# Patient Record
(demographics unavailable — no encounter records)

---

## 2024-10-16 NOTE — ASSESSMENT
[FreeTextEntry1] : Advanced bilateral GH DJD. Previously discussed surgery, he wants to hold off still.  Diclofenac gel 1% BID prescribed. Repeat bilateral GH injections today on 10/16/24.  RTO prn.   Procedure Note: Large Joint Injection was performed because of pain and inflammation, failure of conservative treatment.   Medications: Depo-Medrol: 1 cc, 80 mg. Lidocaine: 2 cc, 1%.  Marcaine: 2 cc, .25%.   Medication was injected in the right glenohumeral joint. Patient has tried OTC's including aspirin, Ibuprofen, Aleve etc or prescription NSAIDs, and/or exercises at home and/ or physical therapy without satisfactory response. The risks, benefits, and alternatives to cortisone injection were explained in full to the patient. Risks outlined include but are not limited to infection, sepsis, bleeding, scarring, skin discoloration, temporary increase in pain, syncopal episode, failure to resolve symptoms, allergic reaction, symptom recurrence, and elevation of blood sugar in diabetics. Patient understood the risks. All questions were answered. After discussion of options, patient requested an injection. Oral informed consent was obtained and sterile prep of the injection site was performed using alcohol. Sterile technique was utilized for the procedure including the preparation of the solutions used for the injection. Ethyl chloride spray was used topically.  Sterile technique used. Patient tolerated procedure well. Post Procedure Instructions: Patient was advised to call if redness, pain, or fever occur and apply ice for 15 min. out of every hour for the next 12-24 hours as tolerated. patient was advised to rest the joint(s) for 2 days.  Ultrasound Guidance was used for the following reasons: for Glenohumeral injection.  Ultrasound guided injection was performed of the shoulder, visualization of the needle and placement of injection was performed without complication.  Procedure Note: Large Joint Injection was performed because of pain and inflammation, failure of conservative treatment.   Medications: Depo-Medrol: 1 cc, 80 mg. Lidocaine: 2 cc, 1%.  Marcaine: 2 cc, .25%.   Medication was injected in the left glenohumeral joint. Patient has tried OTC's including aspirin, Ibuprofen, Aleve etc or prescription NSAIDs, and/or exercises at home and/ or physical therapy without satisfactory response. The risks, benefits, and alternatives to cortisone injection were explained in full to the patient. Risks outlined include but are not limited to infection, sepsis, bleeding, scarring, skin discoloration, temporary increase in pain, syncopal episode, failure to resolve symptoms, allergic reaction, symptom recurrence, and elevation of blood sugar in diabetics. Patient understood the risks. All questions were answered. After discussion of options, patient requested an injection. Oral informed consent was obtained and sterile prep of the injection site was performed using alcohol. Sterile technique was utilized for the procedure including the preparation of the solutions used for the injection. Ethyl chloride spray was used topically.  Sterile technique used. Patient tolerated procedure well. Post Procedure Instructions: Patient was advised to call if redness, pain, or fever occur and apply ice for 15 min. out of every hour for the next 12-24 hours as tolerated. patient was advised to rest the joint(s) for 2 days.  Ultrasound Guidance was used for the following reasons: for Glenohumeral injection.  Ultrasound guided injection was performed of the shoulder, visualization of the needle and placement of injection was performed without complication.

## 2024-10-16 NOTE — HISTORY OF PRESENT ILLNESS
[Gradual] : gradual [8] : 8 [7] : 7 [Radiating] : radiating [Constant] : constant [Rest] : rest [Meds] : meds [Retired] : Work status: retired [de-identified] : 10/16/24 - pt is here for fu on bilateral shoulders, pt state last visit CSI injection helped. would like a repeat of CSI.  6/21/24:  He got great relief from the last injections, flaring up just about 3 weeks ago.    1/17/24: Here for follow up. Patient has relief from CSI until about 2 weeks ago. There was no recent injury.   10/6/23: 80 yo RHD male with bilateral shoulder pain for years, worse more recently. He was last seen in 2021 and had CSI with good relief. There is pain with reaching overhead. He has pain at night.  [] : no [FreeTextEntry1] : b/l shoulders  [FreeTextEntry7] : down to biceps [FreeTextEntry9] : acetametophine  [de-identified] : activity  [de-identified] : 2 years ago +

## 2024-10-21 NOTE — ASSESSMENT
[FreeTextEntry1] : The patient was advised of the diagnosis. The natural history of the pathology was explained in full to the patient in layman's terms. All questions were answered. The risks and benefits of surgical and non-surgical treatment alternatives were explained in full to the patient.  We reviewed the anatomy of the flexor sheath and pathology of trigger fingers with the use of drawings and discussion.  We discussed the treatment options including splinting/nsaids, injection and surgery.  After a discussion of the risks, benefits and alternatives along with the expectations, the patient was amenable to injection.  The indication for injection is pain and inflammation.  The skin overlying the tendon sheath/A1 pulley site was prepared with alcohol and ethyl chloride was sprayed topically.  Sterile technique was used. An injection of 1ml of lidocaine and 6mg of betamethasone was used.  The patient was instructed to call if redness, pain or fever occur.  They may apply ice for 15 minutes every hour for the next 12-24 hours as tolerated.  The patient understands that it may take 2-5 days to see a noticeable difference.  Sterile Band-Aid was applied.  pt was given CSI#3 in the left middle finger  pt will be scheduled for surgery - after thanksgiving - for left middle trigger finger release  Risks including but not limited to infection, blood loss, tendon damage and delayed tendon disruption, muscle damage, nerve damage, stiffness, pain, arthritis, loss of function, potential for secondary surgery, loss of limb or life. The patient had the opportunity to ask questions and all questions were answered to their satisfaction.  We again reviewed the anatomy of the flexor sheath and pathology of trigger fingers with the use of drawings and discussion.  The patient has failed injections/nonoperative treatment.  We discussed the possibility of surgery including the use of an open trigger finger release.  We discussed that too many injections may lead to weakening of the tendon/tendon rupture and that surgery is indicated at this point.  Risks include bleeding, infection, injury to nerves, vessels, tendons, stiffness, pain, loss of range of motion, loss of function, CRPS, and risks and complications of anesthesia.  We discussed the surgical plan and post op expectations.  We also discussed the possibility of prolonged pain/scar tissue and the possible need for therapy.  The patient is amenable to the risks, had the opportunity to ask questions, all questions were answered and the patient signed consent of their own accord.  They will be contacted by my surgical scheduler.  f/u after surgery

## 2024-10-21 NOTE — HISTORY OF PRESENT ILLNESS
[1] : 2 [0] : 0 [Burning] : burning [Sharp] : sharp [Occasional] : occasional [Rest] : rest [de-identified] : 10/21/24: Pt is here for follow up for LT hand . Pt states that he is having stiffness in his entire hand. States that trigger finger in middle finger has been acting up.  6/24/2024: pt here with middle finger recurrent locking s/p CSI #1. Pt denies hx of trauma.   1/29/24:  left middle finger locking.  [] : no [FreeTextEntry1] : Left Hand [FreeTextEntry5] : Patient MARIMAR ALONSO is 81 years old and is being evaluated for the Left hand after experiencing pain.

## 2025-06-28 NOTE — HISTORY OF PRESENT ILLNESS
[FreeTextEntry1] : 82 yo M with history of left renal mass on active surveillance Last evaluated in 2018 by Dr. Gooden no flank pain no urinary issues No urology visit or imaging done since 2018 Of note, history of right partial nephrectomy - oncocytoma  1/3/24 Interval history: Doing well since last visit with no issues Here to review MRI  6/19/24 Interval history: No complaints today Had CT on 6/13/24 which showed slight increase in left renal mass size from 1.8cm to 2.2cm as well as an enhancing mural nodule in one of the cysts and a prominent low-density aortocaval lymph node  6/2

## 2025-07-22 NOTE — ASSESSMENT
[FreeTextEntry1] : Advanced bilateral GH DJD. Previously discussed surgery, he wants to hold off still.  Diclofenac gel 1% BID as needed.  Repeat bilateral GH injections today on 10/16/24.  RTO prn.   Procedure Note: Large Joint Injection was performed because of pain and inflammation, failure of conservative treatment.   Medications: Depo-Medrol: 1 cc, 80 mg. Lidocaine: 2 cc, 1%.  Marcaine: 2 cc, .25%.   Medication was injected in the right glenohumeral joint. Patient has tried OTC's including aspirin, Ibuprofen, Aleve etc or prescription NSAIDs, and/or exercises at home and/ or physical therapy without satisfactory response. The risks, benefits, and alternatives to cortisone injection were explained in full to the patient. Risks outlined include but are not limited to infection, sepsis, bleeding, scarring, skin discoloration, temporary increase in pain, syncopal episode, failure to resolve symptoms, allergic reaction, symptom recurrence, and elevation of blood sugar in diabetics. Patient understood the risks. All questions were answered. After discussion of options, patient requested an injection. Oral informed consent was obtained and sterile prep of the injection site was performed using alcohol. Sterile technique was utilized for the procedure including the preparation of the solutions used for the injection. Ethyl chloride spray was used topically.  Sterile technique used. Patient tolerated procedure well. Post Procedure Instructions: Patient was advised to call if redness, pain, or fever occur and apply ice for 15 min. out of every hour for the next 12-24 hours as tolerated. patient was advised to rest the joint(s) for 2 days.  Ultrasound Guidance was used for the following reasons: for Glenohumeral injection.  Ultrasound guided injection was performed of the shoulder, visualization of the needle and placement of injection was performed without complication.  Procedure Note: Large Joint Injection was performed because of pain and inflammation, failure of conservative treatment.   Medications: Depo-Medrol: 1 cc, 80 mg. Lidocaine: 2 cc, 1%.  Marcaine: 2 cc, .25%.   Medication was injected in the left glenohumeral joint. Patient has tried OTC's including aspirin, Ibuprofen, Aleve etc or prescription NSAIDs, and/or exercises at home and/ or physical therapy without satisfactory response. The risks, benefits, and alternatives to cortisone injection were explained in full to the patient. Risks outlined include but are not limited to infection, sepsis, bleeding, scarring, skin discoloration, temporary increase in pain, syncopal episode, failure to resolve symptoms, allergic reaction, symptom recurrence, and elevation of blood sugar in diabetics. Patient understood the risks. All questions were answered. After discussion of options, patient requested an injection. Oral informed consent was obtained and sterile prep of the injection site was performed using alcohol. Sterile technique was utilized for the procedure including the preparation of the solutions used for the injection. Ethyl chloride spray was used topically.  Sterile technique used. Patient tolerated procedure well. Post Procedure Instructions: Patient was advised to call if redness, pain, or fever occur and apply ice for 15 min. out of every hour for the next 12-24 hours as tolerated. patient was advised to rest the joint(s) for 2 days.  Ultrasound Guidance was used for the following reasons: for Glenohumeral injection.  Ultrasound guided injection was performed of the shoulder, visualization of the needle and placement of injection was performed without complication.

## 2025-07-22 NOTE — HISTORY OF PRESENT ILLNESS
[Gradual] : gradual [8] : 8 [7] : 7 [Radiating] : radiating [Constant] : constant [Rest] : rest [Meds] : meds [Retired] : Work status: retired [de-identified] : 7/22/2025: Here for follow up.  He did get relief from the last injections but pain started to return a few months ago.   He has some pain in the upper back and neck, and into the posterior arm.   10/16/24 - pt is here for fu on bilateral shoulders, pt state last visit CSI injection helped. would like a repeat of CSI.  6/21/24:  He got great relief from the last injections, flaring up just about 3 weeks ago.    1/17/24: Here for follow up. Patient has relief from CSI until about 2 weeks ago. There was no recent injury.   10/6/23: 82 yo RHD male with bilateral shoulder pain for years, worse more recently. He was last seen in 2021 and had CSI with good relief. There is pain with reaching overhead. He has pain at night.  [] : no [FreeTextEntry1] : b/l shoulders  [FreeTextEntry7] : down to biceps [FreeTextEntry9] : acetametophine  [de-identified] : activity  [de-identified] : 2 years ago +